# Patient Record
Sex: MALE | Race: WHITE | URBAN - METROPOLITAN AREA
[De-identification: names, ages, dates, MRNs, and addresses within clinical notes are randomized per-mention and may not be internally consistent; named-entity substitution may affect disease eponyms.]

---

## 2018-04-02 ENCOUNTER — OFFICE VISIT (OUTPATIENT)
Dept: FAMILY MEDICINE | Facility: CLINIC | Age: 23
End: 2018-04-02
Payer: COMMERCIAL

## 2018-04-02 VITALS
TEMPERATURE: 99.2 F | SYSTOLIC BLOOD PRESSURE: 116 MMHG | OXYGEN SATURATION: 99 % | BODY MASS INDEX: 30.52 KG/M2 | RESPIRATION RATE: 20 BRPM | HEART RATE: 62 BPM | HEIGHT: 71 IN | WEIGHT: 218 LBS | DIASTOLIC BLOOD PRESSURE: 61 MMHG

## 2018-04-02 DIAGNOSIS — Z00.00 ROUTINE GENERAL MEDICAL EXAMINATION AT A HEALTH CARE FACILITY: Primary | ICD-10-CM

## 2018-04-02 DIAGNOSIS — L70.9 ACNE, UNSPECIFIED ACNE TYPE: ICD-10-CM

## 2018-04-02 DIAGNOSIS — A35 TETANUS: ICD-10-CM

## 2018-04-02 DIAGNOSIS — J30.9 ALLERGIC RHINITIS, UNSPECIFIED CHRONICITY, UNSPECIFIED SEASONALITY, UNSPECIFIED TRIGGER: ICD-10-CM

## 2018-04-02 PROCEDURE — 99395 PREV VISIT EST AGE 18-39: CPT | Performed by: FAMILY MEDICINE

## 2018-04-02 PROCEDURE — 90714 TD VACC NO PRESV 7 YRS+ IM: CPT | Performed by: FAMILY MEDICINE

## 2018-04-02 RX ORDER — MINOCYCLINE HYDROCHLORIDE 100 MG/1
100 CAPSULE ORAL 2 TIMES DAILY
Qty: 180 CAPSULE | Refills: 3 | Status: SHIPPED | OUTPATIENT
Start: 2018-04-02 | End: 2019-05-23

## 2018-04-02 NOTE — NURSING NOTE
"Chief Complaint   Patient presents with     Physical     Health Maintenance       Initial /61  Pulse 62  Temp 99.2  F (37.3  C) (Oral)  Resp 20  Ht 5' 10.5\" (1.791 m)  Wt 218 lb (98.9 kg)  SpO2 99%  BMI 30.84 kg/m2 Estimated body mass index is 30.84 kg/(m^2) as calculated from the following:    Height as of this encounter: 5' 10.5\" (1.791 m).    Weight as of this encounter: 218 lb (98.9 kg).    Haydee Gonzalez, RUTH    "

## 2018-04-02 NOTE — MR AVS SNAPSHOT
"              After Visit Summary   2018    Yogi Simpson    MRN: [id number]           Patient Information     Date Of Birth          1995        Visit Information        Provider Department      2018 11:55 AM Rayshawn Bach MD [facility]        Today's Diagnoses     Routine general medical examination at a health care facility    -  1    Acne, unspecified acne type        Allergic rhinitis, unspecified chronicity, unspecified seasonality, unspecified trigger        Tetanus           Follow-ups after your visit        Who to contact     If you have questions or need follow up information about today's clinic visit or your schedule please contact [facility] directly at [phone].  Normal or non-critical lab and imaging results will be communicated to you by MyChart, letter or phone within 4 business days after the clinic has received the results. If you do not hear from us within 7 days, please contact the clinic through MyChart or phone. If you have a critical or abnormal lab result, we will notify you by phone as soon as possible.  Submit refill requests through [employer] or call your pharmacy and they will forward the refill request to us. Please allow 3 business days for your refill to be completed.          Additional Information About Your Visit        MyChart Information     [employer] lets you send messages to your doctor, view your test results, renew your prescriptions, schedule appointments and more. To sign up, go to [email or web address] . Click on \"Log in\" on the left side of the screen, which will take you to the Welcome page. Then click on \"Sign up Now\" on the right side of the page.     You will be asked to enter the access code listed below, as well as some personal information. Please follow the directions to create your username and password.     Your access code is: [email or web address]  Expires: 2018  1:15 PM     Your access code will  in 90 " "days. If you need help or a new code, please call your [facility] clinic or [phone].        Care EveryWhere ID     This is your Care EveryWhere ID. This could be used by other organizations to access your [facility] medical records  [id number]        Your Vitals Were     Pulse Temperature Respirations Height Pulse Oximetry BMI (Body Mass Index)    62 99.2  F (37.3  C) (Oral) 20 5' 10.5\" (1.791 m) 99% 30.84 kg/m2       Blood Pressure from Last 3 Encounters:   04/02/18 116/61   07/26/13 103/70   04/03/12 122/71    Weight from Last 3 Encounters:   04/02/18 218 lb (98.9 kg)   07/26/13 175 lb (79.4 kg) (81 %)*   04/03/12 156 lb (70.8 kg) (70 %)*     * Growth percentiles are based on [facility] 2-20 Years data.              We Performed the Following     TD PRESERV FREE >=7 YRS ADS IM          Where to get your medicines      These medications were sent to [facility] [address] [address], [address]  [id number] [place] [address], [place] [address], [facility] [address]     Phone:  [phone]     minocycline 100 MG capsule          Primary Care Provider Office Phone # Fax #    Rayshawn Bach [phone] [address] [facility] [place] [address]        Equal Access to Services     DAPHNE MOJICA AH: Hadii katarzyna ray hadasho Soesali, waaxda luqadaha, qaybta kaalmada jesusyada, mehdi ricardo. So [facility] [phone].    ATENCIÓN: Si habla español, tiene a pro disposición servicios gratuitos de asistencia lingüística. Llame al [phone].    We comply with applicable federal civil rights laws and [place] laws. We do not discriminate on the basis of race, color, national origin, age, disability, sex, sexual orientation, or gender identity.            Thank you!     Thank you for choosing [facility]  for your care. Our goal is always to provide you with excellent care. Hearing back from our patients is one way we can continue to improve our services. Please take a few " minutes to complete the written survey that you may receive in the mail after your visit with us. Thank you!             Your Updated Medication List - Protect others around you: Learn how to safely use, store and throw away your medicines at www.disposemymeds.org.          This list is accurate as of 4/2/18  1:15 PM.  Always use your most recent med list.                   Brand Name Dispense Instructions for use Diagnosis    fluticasone 50 MCG/ACT spray    FLONASE    1 Package    Spray 2 sprays into both nostrils daily as needed for rhinitis.    AR (allergic rhinitis)       minocycline 100 MG capsule    MINOCIN/DYNACIN    180 capsule    Take 1 capsule (100 mg) by mouth 2 times daily    Acne, unspecified acne type

## 2018-04-02 NOTE — PROGRESS NOTES
SUBJECTIVE:   CC: Yogi Simpson is an 23 year old male who presents for preventative health visit.     Going to school at [employer]. Education 2 years left.   Working part time. Dating not currently. Emotionally ok.   Teaching degree. Coaching. Active.   No std concerns. Would like refill with minocycline for acne.   flonase - seasonal.     Physical   Annual:     Getting at least 3 servings of Calcium per day::  Yes    Bi-annual eye exam::  NO    Dental care twice a year::  Yes    Sleep apnea or symptoms of sleep apnea::  None    Diet::  Carbohydrate counting    Frequency of exercise::  6-7 days/week    Duration of exercise::  Greater than 60 minutes    Taking medications regularly::  Yes    Medication side effects::  None    Additional concerns today::  No            PROBLEMS TO ADD ON...    Today's PHQ-2 Score:   PHQ-2 ( 1999 [employer]) 4/2/2018   Q1: Little interest or pleasure in doing things 0   Q2: Feeling down, depressed or hopeless 0   PHQ-2 Score 0   Q1: Little interest or pleasure in doing things Not at all   Q2: Feeling down, depressed or hopeless Not at all   PHQ-2 Score 0       Abuse: Current or Past(Physical, Sexual or Emotional)- No  Do you feel safe in your environment - Yes    Social History   Substance Use Topics     Smoking status: Never Smoker     Smokeless tobacco: Never Used     Alcohol use No     Alcohol Use 4/2/2018   If you drink alcohol do you typically have greater than 3 drinks per day OR greater than 7 drinks per week? No       Last PSA: No results found for: PSA    Reviewed orders with patient. Reviewed health maintenance and updated orders accordingly - Yes  Labs reviewed in EPIC    Reviewed and updated as needed this visit by clinical staff  Tobacco  Allergies  Meds  Med Hx  Fam Hx  Soc Hx        Reviewed and updated as needed this visit by Provider            Review of Systems  C: NEGATIVE for fever, chills, change in weight  I: NEGATIVE for worrisome rashes, moles or lesions.  "Acne. No mole changes.   E: NEGATIVE for vision changes or irritation  ENT: NEGATIVE for ear, mouth and throat problems. Some snoring but no dayne.   R: NEGATIVE for significant cough or SOB  CV: NEGATIVE for chest pain, palpitations or peripheral edema. Good exercise tolerance.  GI: NEGATIVE for nausea, abdominal pain, heartburn, or change in bowel habits. No black or bloody stools.    male: negative for dysuria, hematuria, decreased urinary stream, erectile dysfunction, urethral discharge  M: NEGATIVE for significant arthralgias or myalgia  N: NEGATIVE for weakness, dizziness or paresthesias  E: NEGATIVE for temperature intolerance, skin/hair changes  H: NEGATIVE for bleeding problems  P: NEGATIVE for changes in mood or affect    OBJECTIVE:   /61  Pulse 62  Temp 99.2  F (37.3  C) (Oral)  Resp 20  Ht 5' 10.5\" (1.791 m)  Wt 218 lb (98.9 kg)  SpO2 99%  BMI 30.84 kg/m2    Physical Exam  GENERAL: healthy, alert and no distress  EYES: Eyes grossly normal to inspection, PERRL and conjunctivae and sclerae normal  HENT: ear canals and TM's normal, nose and mouth without ulcers or lesions  NECK: no adenopathy, no asymmetry, masses, or scars and thyroid normal to palpation  RESP: lungs clear to auscultation - no rales, rhonchi or wheezes  BREAST: normal without masses, tenderness or nipple discharge and no palpable axillary masses or adenopathy  CV: regular rate and rhythm, normal S1 S2, no S3 or S4, no murmur, click or rub, no peripheral edema and peripheral pulses strong  ABDOMEN: soft, nontender, no hepatosplenomegaly, no masses and bowel sounds normal   (male): patient deferred  exam. Denies testicle pain/masses/hernia.  MS: no gross musculoskeletal defects noted, no edema  SKIN: no suspicious lesions or rashes and acne diffusely on trunk/face.  NEURO: Normal strength and tone, mentation intact and speech normal  BACK: no CVA tenderness, no paralumbar tenderness  PSYCH: mentation appears normal, affect " "normal/bright  LYMPH: no cervical, supraclavicular, axillary, or inguinal adenopathy    ASSESSMENT/PLAN:   ASSESSMENT / PLAN:  (Z00.00) Routine general medical examination at a health care facility  (primary encounter diagnosis)  Comment: generally healthy and normal exam  Plan: Reviewed self mole/testicle check handout.  Safe sex/avoid illicit drugs/no drinking and driving. College and family doing well. Call/email with questions/concerns   Follow-up full eye exam.     (L70.9) Acne, unspecified acne type  Comment: needs help  Plan: minocycline (MINOCIN/DYNACIN) 100 MG capsule        Restart minocin. Reveiwed risks and side effects of medication  Avoid prolonged sun exposure.    (J30.9) Allergic rhinitis, unspecified chronicity, unspecified seasonality, unspecified trigger  Comment: stable  Plan: prn flonase    (A35) Tetanu  Plan: TD PRESERV FREE >=7 YRS ADS IM            COUNSELING:   Reviewed preventive health counseling, as reflected in patient instructions       Regular exercise       Healthy diet/nutrition       Vision screening       Safe sex practices/STD prevention         reports that he has never smoked. He has never used smokeless tobacco.    Estimated body mass index is 30.84 kg/(m^2) as calculated from the following:    Height as of this encounter: 5' 10.5\" (1.791 m).    Weight as of this encounter: 218 lb (98.9 kg).       Counseling Resources:  ATP IV Guidelines  Pooled Cohorts Equation Calculator  FRAX Risk Assessment  ICSI Preventive Guidelines  Dietary Guidelines for Americans, 2010  USDA's MyPlate  ASA Prophylaxis  Lung CA Screening    Rayshawn Bach MD  [facility] [place]  Answers for HPI/ROS submitted by the patient on 4/2/2018   PHQ-2 Score: 0    "

## 2018-04-02 NOTE — NURSING NOTE
Screening Questionnaire for Adult Immunization    Are you sick today?   No   Do you have allergies to medications, food, a vaccine component or latex?   Yes   Have you ever had a serious reaction after receiving a vaccination?   No   Do you have a long-term health problem with heart disease, lung disease, asthma, kidney disease, metabolic disease (e.g. diabetes), anemia, or other blood disorder?   No   Do you have cancer, leukemia, HIV/AIDS, or any other immune system problem?   No   In the past 3 months, have you taken medications that affect  your immune system, such as prednisone, other steroids, or anticancer drugs; drugs for the treatment of rheumatoid arthritis, Crohn s disease, or psoriasis; or have you had radiation treatments?   No   Have you had a seizure, or a brain or other nervous system problem?   No   During the past year, have you received a transfusion of blood or blood     products, or been given immune (gamma) globulin or antiviral drug?   No   For women: Are you pregnant or is there a chance you could become        pregnant during the next month?   No   Have you received any vaccinations in the past 4 weeks?   No     Immunization questionnaire was positive for at least one answer.  Notified see allergies.        Per orders of Dr. Bach, injection of Td given by Haydee Gonzalez. Patient instructed to remain in clinic for 15 minutes afterwards, and to report any adverse reaction to me immediately.       Screening performed by Haydee Gonzalez on 4/2/2018 at 11:26 AM.

## 2018-04-02 NOTE — NURSING NOTE
VISION   No corrective lenses  Tool used: HERB   Right eye:        10/20 (20/40)    Both 20/20  Left eye:          10/25 (20/50)  Visual Acuity: REFER  H Plus Lens Screening: Pass  Color vision screening: Pass

## 2019-03-11 ENCOUNTER — OFFICE VISIT (OUTPATIENT)
Dept: FAMILY MEDICINE | Facility: CLINIC | Age: 24
End: 2019-03-11
Payer: COMMERCIAL

## 2019-03-11 VITALS
DIASTOLIC BLOOD PRESSURE: 60 MMHG | TEMPERATURE: 98.3 F | OXYGEN SATURATION: 97 % | SYSTOLIC BLOOD PRESSURE: 115 MMHG | BODY MASS INDEX: 31.92 KG/M2 | HEART RATE: 65 BPM | RESPIRATION RATE: 20 BRPM | WEIGHT: 228 LBS | HEIGHT: 71 IN

## 2019-03-11 DIAGNOSIS — R06.02 SOB (SHORTNESS OF BREATH): Primary | ICD-10-CM

## 2019-03-11 LAB
ERYTHROCYTE [DISTWIDTH] IN BLOOD BY AUTOMATED COUNT: 12.3 % (ref 10–15)
HCT VFR BLD AUTO: 45.7 % (ref 40–53)
HGB BLD-MCNC: 15.7 G/DL (ref 13.3–17.7)
MCH RBC QN AUTO: 30.8 PG (ref 26.5–33)
MCHC RBC AUTO-ENTMCNC: 34.4 G/DL (ref 31.5–36.5)
MCV RBC AUTO: 90 FL (ref 78–100)
PLATELET # BLD AUTO: 190 10E9/L (ref 150–450)
RBC # BLD AUTO: 5.1 10E12/L (ref 4.4–5.9)
WBC # BLD AUTO: 6.7 10E9/L (ref 4–11)

## 2019-03-11 PROCEDURE — 93000 ELECTROCARDIOGRAM COMPLETE: CPT | Performed by: FAMILY MEDICINE

## 2019-03-11 PROCEDURE — 85027 COMPLETE CBC AUTOMATED: CPT | Performed by: FAMILY MEDICINE

## 2019-03-11 PROCEDURE — 99214 OFFICE O/P EST MOD 30 MIN: CPT | Performed by: FAMILY MEDICINE

## 2019-03-11 PROCEDURE — 36415 COLL VENOUS BLD VENIPUNCTURE: CPT | Performed by: FAMILY MEDICINE

## 2019-03-11 RX ORDER — ALBUTEROL SULFATE 90 UG/1
1-2 AEROSOL, METERED RESPIRATORY (INHALATION) EVERY 4 HOURS PRN
Qty: 1 INHALER | Refills: 1 | Status: SHIPPED | OUTPATIENT
Start: 2019-03-11

## 2019-03-11 ASSESSMENT — MIFFLIN-ST. JEOR: SCORE: 2043.39

## 2019-03-11 NOTE — PROGRESS NOTES
"SUBJECTIVE:  Yogi Simpson, a 23 year old male scheduled an appointment to discuss the following issues:  Shortness of breath with activity noted past 5-6 months. Doing more cardio. ?some wheezing.   No MDI in past. Active. No chest pain. No family history cardiac issues.   No history asthma. Mom with mild asthma issues.   No cough. Some sinus congestion. Some snoring.   Girlfriends - no dayne noted. No major fatigue. Sleep ok. Some gerd symptoms intermittently. Zantac in past.  Occasionally palpitations.   Weight lifting 1-2 hours and 1/2 cardio - worse with prolonged work out.   No family history cardiac issues.   No past medical history on file.    No past surgical history on file.    Family History   Problem Relation Age of Onset     Diabetes Mother      Cancer - colorectal Paternal Grandfather        Social History     Tobacco Use     Smoking status: Never Smoker     Smokeless tobacco: Never Used   Substance Use Topics     Alcohol use: Yes     Comment: occ       ROS:  All other ROS negative.     OBJECTIVE:  /60   Pulse 65   Temp 98.3  F (36.8  C) (Oral)   Resp 20   Ht 1.791 m (5' 10.5\")   Wt 103.4 kg (228 lb)   SpO2 97%   BMI 32.25 kg/m    EXAM:  GENERAL APPEARANCE: healthy, alert and no distress  EYES: EOMI,  PERRL  HENT: ear canals and TM's normal and nose and mouth without ulcers or lesions  NECK: no adenopathy, no asymmetry, masses, or scars and thyroid normal to palpation  RESP: lungs clear to auscultation - no rales, rhonchi or wheezes  CV: regular rates and rhythm, normal S1 S2, no S3 or S4 and no murmur, click or rub -  ABDOMEN:  soft, nontender, no HSM or masses and bowel sounds normal  MS: extremities normal- no gross deformities noted, no evidence of inflammation in joints, FROM in all extremities.  PSYCH: mentation appears normal and affect normal/bright  ASSESSMENT / PLAN:  (R06.02) SOB (shortness of breath)  (primary encounter diagnosis)  Comment: possible mild exercise asthma vs " gerd vs ?  Plan: EKG 12-lead complete w/read - Clinics, CBC with        platelets, albuterol (PROAIR HFA/PROVENTIL         HFA/VENTOLIN HFA) 108 (90 Base) MCG/ACT inhaler        Trial albuterol mdi. Over the counter zantac [id number] and girlfriend to monitor for apnea. Consider sleep study or stress echo. Acutely worsening shortness of breath or chest pain to er. On my chart. Call/email with questions/concerns. Expected course and warning signs reviewed.         Rayshawn Bach

## 2019-03-11 NOTE — NURSING NOTE
"Chief Complaint   Patient presents with     Personal       Initial /60   Pulse 65   Temp 98.3  F (36.8  C) (Oral)   Resp 20   Ht 1.791 m (5' 10.5\")   Wt 103.4 kg (228 lb)   SpO2 97%   BMI 32.25 kg/m   Estimated body mass index is 32.25 kg/m  as calculated from the following:    Height as of this encounter: 1.791 m (5' 10.5\").    Weight as of this encounter: 103.4 kg (228 lb).    Haydee Gonzalez, RUTH    "

## 2019-05-23 DIAGNOSIS — L70.9 ACNE, UNSPECIFIED ACNE TYPE: ICD-10-CM

## 2019-05-23 RX ORDER — MINOCYCLINE HYDROCHLORIDE 100 MG/1
CAPSULE ORAL
Qty: 60 CAPSULE | Refills: 0 | Status: SHIPPED | OUTPATIENT
Start: 2019-05-23 | End: 2019-07-31

## 2019-05-23 NOTE — TELEPHONE ENCOUNTER
1 month supply only as patient is overdue for appointment       TC, patient due for:  Physical, acne medication refill    Leila Carmona BSN, RN

## 2019-05-23 NOTE — LETTER
May 24, 2019    Yogi Simpson  [address]  [facility] [address]    Dear Yogi,       We recently received a refill request for minocycline (MINOCIN/DYNACIN) 100 MG capsule.  We have refilled this for a one time 30 day supply only because you are due for a:    Physical and acne office visit      Please call at your earliest convenience so that there will not be a delay with your future refills.          Thank you,   Your [facility] Team/  [phone]

## 2019-07-31 DIAGNOSIS — L70.9 ACNE, UNSPECIFIED ACNE TYPE: ICD-10-CM

## 2019-07-31 RX ORDER — MINOCYCLINE HYDROCHLORIDE 100 MG/1
100 CAPSULE ORAL 2 TIMES DAILY PRN
Qty: 180 CAPSULE | Refills: 1 | Status: SHIPPED | OUTPATIENT
Start: 2019-07-31

## 2019-07-31 NOTE — TELEPHONE ENCOUNTER
Routing refill request to provider for review/approval because:  Liliya given x1 and patient did not follow up, please advise    No appointment pending at this time.  Routing to provider to advise.    Leila Carmona BSN, RN

## 2020-03-02 ENCOUNTER — HEALTH MAINTENANCE LETTER (OUTPATIENT)
Age: 25
End: 2020-03-02

## 2020-12-14 ENCOUNTER — HEALTH MAINTENANCE LETTER (OUTPATIENT)
Age: 25
End: 2020-12-14

## 2021-04-18 ENCOUNTER — HEALTH MAINTENANCE LETTER (OUTPATIENT)
Age: 26
End: 2021-04-18

## 2021-10-02 ENCOUNTER — HEALTH MAINTENANCE LETTER (OUTPATIENT)
Age: 26
End: 2021-10-02

## 2022-05-14 ENCOUNTER — HEALTH MAINTENANCE LETTER (OUTPATIENT)
Age: 27
End: 2022-05-14

## 2022-09-03 ENCOUNTER — HEALTH MAINTENANCE LETTER (OUTPATIENT)
Age: 27
End: 2022-09-03

## 2023-06-03 ENCOUNTER — HEALTH MAINTENANCE LETTER (OUTPATIENT)
Age: 28
End: 2023-06-03